# Patient Record
Sex: FEMALE | Race: WHITE | NOT HISPANIC OR LATINO
[De-identification: names, ages, dates, MRNs, and addresses within clinical notes are randomized per-mention and may not be internally consistent; named-entity substitution may affect disease eponyms.]

---

## 2017-09-21 ENCOUNTER — LAB SERVICES (OUTPATIENT)
Dept: OTHER | Age: 52
End: 2017-09-21

## 2017-09-21 ENCOUNTER — CHARTING TRANS (OUTPATIENT)
Dept: OTHER | Age: 52
End: 2017-09-21

## 2017-09-21 LAB — RAPID STREP GROUP A: NORMAL

## 2017-10-16 ENCOUNTER — HOSPITAL (OUTPATIENT)
Dept: OTHER | Age: 52
End: 2017-10-16

## 2017-10-16 ENCOUNTER — IMAGING SERVICES (OUTPATIENT)
Dept: OTHER | Age: 52
End: 2017-10-16

## 2017-10-28 ENCOUNTER — HOSPITAL (OUTPATIENT)
Dept: OTHER | Age: 52
End: 2017-10-28
Attending: INTERNAL MEDICINE

## 2018-03-06 ENCOUNTER — IMAGING SERVICES (OUTPATIENT)
Dept: OTHER | Age: 53
End: 2018-03-06

## 2018-03-06 ENCOUNTER — HOSPITAL (OUTPATIENT)
Dept: OTHER | Age: 53
End: 2018-03-06

## 2018-03-27 ENCOUNTER — HOSPITAL (OUTPATIENT)
Dept: OTHER | Age: 53
End: 2018-03-27

## 2018-03-27 ENCOUNTER — IMAGING SERVICES (OUTPATIENT)
Dept: OTHER | Age: 53
End: 2018-03-27

## 2018-04-16 PROBLEM — R91.8 ABNORMALITY OF LUNG ON CHEST X-RAY: Status: ACTIVE | Noted: 2018-04-16

## 2018-04-20 PROBLEM — J41.0 SIMPLE CHRONIC BRONCHITIS (HCC): Status: ACTIVE | Noted: 2018-04-20

## 2018-05-13 PROBLEM — Z87.891 FORMER SMOKER: Status: ACTIVE | Noted: 2018-05-13

## 2018-08-07 PROCEDURE — 82784 ASSAY IGA/IGD/IGG/IGM EACH: CPT | Performed by: INTERNAL MEDICINE

## 2018-08-07 PROCEDURE — 82785 ASSAY OF IGE: CPT | Performed by: INTERNAL MEDICINE

## 2018-10-15 PROCEDURE — 86225 DNA ANTIBODY NATIVE: CPT | Performed by: INTERNAL MEDICINE

## 2018-10-15 PROCEDURE — 86038 ANTINUCLEAR ANTIBODIES: CPT | Performed by: INTERNAL MEDICINE

## 2018-10-15 PROCEDURE — 86235 NUCLEAR ANTIGEN ANTIBODY: CPT | Performed by: INTERNAL MEDICINE

## 2018-10-15 PROCEDURE — 83516 IMMUNOASSAY NONANTIBODY: CPT | Performed by: INTERNAL MEDICINE

## 2018-10-15 PROCEDURE — 86255 FLUORESCENT ANTIBODY SCREEN: CPT | Performed by: INTERNAL MEDICINE

## 2018-10-15 PROCEDURE — 36415 COLL VENOUS BLD VENIPUNCTURE: CPT | Performed by: INTERNAL MEDICINE

## 2018-10-15 PROCEDURE — 82085 ASSAY OF ALDOLASE: CPT | Performed by: INTERNAL MEDICINE

## 2018-10-15 PROCEDURE — 82103 ALPHA-1-ANTITRYPSIN TOTAL: CPT | Performed by: INTERNAL MEDICINE

## 2018-10-15 PROCEDURE — 86200 CCP ANTIBODY: CPT | Performed by: INTERNAL MEDICINE

## 2018-11-02 VITALS
SYSTOLIC BLOOD PRESSURE: 152 MMHG | DIASTOLIC BLOOD PRESSURE: 92 MMHG | HEART RATE: 66 BPM | OXYGEN SATURATION: 98 % | TEMPERATURE: 98.5 F | RESPIRATION RATE: 16 BRPM

## 2018-12-26 PROCEDURE — 86606 ASPERGILLUS ANTIBODY: CPT | Performed by: INTERNAL MEDICINE

## 2018-12-26 PROCEDURE — 86331 IMMUNODIFFUSION OUCHTERLONY: CPT | Performed by: INTERNAL MEDICINE

## 2019-01-06 PROBLEM — A31.0 ATYPICAL MYCOBACTERIAL INFECTION OF LUNG (HCC): Status: ACTIVE | Noted: 2019-01-06

## 2019-02-04 PROBLEM — K21.00 GASTROESOPHAGEAL REFLUX DISEASE WITH ESOPHAGITIS: Status: ACTIVE | Noted: 2019-02-04

## 2019-04-15 PROBLEM — H81.13 BPV (BENIGN POSITIONAL VERTIGO), BILATERAL: Status: ACTIVE | Noted: 2019-04-15

## 2019-04-26 ENCOUNTER — HOSPITAL (OUTPATIENT)
Dept: OTHER | Age: 54
End: 2019-04-26

## 2019-04-26 ENCOUNTER — HOSPITAL (OUTPATIENT)
Dept: OTHER | Age: 54
End: 2019-04-26
Attending: INTERNAL MEDICINE

## 2019-04-29 LAB — PATHOLOGIST NAME: NORMAL

## 2019-07-17 PROBLEM — R73.9 HYPERGLYCEMIA: Status: ACTIVE | Noted: 2019-07-17

## 2019-10-25 PROBLEM — F41.9 MILD ANXIETY: Status: ACTIVE | Noted: 2019-10-25

## 2019-10-25 PROBLEM — I25.10 CORONARY ARTERIOSCLEROSIS IN NATIVE ARTERY: Status: ACTIVE | Noted: 2019-10-25

## 2020-01-07 PROBLEM — A31.8: Status: ACTIVE | Noted: 2020-01-07

## 2020-01-07 PROBLEM — H60.501 ACUTE OTITIS EXTERNA OF RIGHT EAR, UNSPECIFIED TYPE: Status: ACTIVE | Noted: 2020-01-07

## 2020-01-07 PROBLEM — R05.9 COUGH: Status: ACTIVE | Noted: 2020-01-07

## 2020-04-10 PROBLEM — J30.1 SEASONAL ALLERGIC RHINITIS DUE TO POLLEN: Status: ACTIVE | Noted: 2020-04-10

## 2020-04-10 PROBLEM — R55 VASOMOTOR INSTABILITY: Status: ACTIVE | Noted: 2020-04-10

## 2020-04-10 PROBLEM — H60.501 ACUTE OTITIS EXTERNA OF RIGHT EAR, UNSPECIFIED TYPE: Status: RESOLVED | Noted: 2020-01-07 | Resolved: 2020-04-10

## 2020-06-18 PROBLEM — R92.8 ABNORMALITY OF LEFT BREAST ON SCREENING MAMMOGRAM: Status: ACTIVE | Noted: 2020-06-18

## 2020-09-28 PROBLEM — G62.0 DRUG-INDUCED POLYNEUROPATHY (HCC): Status: ACTIVE | Noted: 2020-09-28

## 2021-10-20 PROBLEM — R05.9 COUGH: Status: RESOLVED | Noted: 2020-01-07 | Resolved: 2021-10-20

## 2021-10-20 PROBLEM — R92.8 ABNORMALITY OF LEFT BREAST ON SCREENING MAMMOGRAM: Status: RESOLVED | Noted: 2020-06-18 | Resolved: 2021-10-20

## 2021-10-20 PROBLEM — A31.8: Status: RESOLVED | Noted: 2020-01-07 | Resolved: 2021-10-20

## 2021-10-20 PROBLEM — R91.8 ABNORMALITY OF LUNG ON CHEST X-RAY: Status: RESOLVED | Noted: 2018-04-16 | Resolved: 2021-10-20

## 2022-01-21 PROBLEM — U07.1 COVID-19 VIRUS INFECTION: Status: ACTIVE | Noted: 2022-01-21

## 2024-11-08 ENCOUNTER — HOSPITAL ENCOUNTER (OUTPATIENT)
Facility: HOSPITAL | Age: 59
Setting detail: HOSPITAL OUTPATIENT SURGERY
Discharge: HOME | End: 2024-11-08
Attending: INTERNAL MEDICINE | Admitting: INTERNAL MEDICINE
Payer: COMMERCIAL

## 2024-11-08 ENCOUNTER — ANESTHESIA (OUTPATIENT)
Dept: ENDOSCOPY | Facility: HOSPITAL | Age: 59
End: 2024-11-08
Payer: COMMERCIAL

## 2024-11-08 ENCOUNTER — ANESTHESIA EVENT (OUTPATIENT)
Dept: ENDOSCOPY | Facility: HOSPITAL | Age: 59
End: 2024-11-08
Payer: COMMERCIAL

## 2024-11-08 VITALS
TEMPERATURE: 97 F | OXYGEN SATURATION: 100 % | SYSTOLIC BLOOD PRESSURE: 123 MMHG | RESPIRATION RATE: 16 BRPM | DIASTOLIC BLOOD PRESSURE: 66 MMHG | WEIGHT: 153 LBS | HEIGHT: 64 IN | HEART RATE: 55 BPM | BODY MASS INDEX: 26.12 KG/M2

## 2024-11-08 PROCEDURE — 0DB68ZX EXCISION OF STOMACH, VIA NATURAL OR ARTIFICIAL OPENING ENDOSCOPIC, DIAGNOSTIC: ICD-10-PCS | Performed by: INTERNAL MEDICINE

## 2024-11-08 PROCEDURE — 0DB38ZX EXCISION OF LOWER ESOPHAGUS, VIA NATURAL OR ARTIFICIAL OPENING ENDOSCOPIC, DIAGNOSTIC: ICD-10-PCS | Performed by: INTERNAL MEDICINE

## 2024-11-08 PROCEDURE — 0DB18ZX EXCISION OF UPPER ESOPHAGUS, VIA NATURAL OR ARTIFICIAL OPENING ENDOSCOPIC, DIAGNOSTIC: ICD-10-PCS | Performed by: INTERNAL MEDICINE

## 2024-11-08 PROCEDURE — 88305 TISSUE EXAM BY PATHOLOGIST: CPT | Performed by: INTERNAL MEDICINE

## 2024-11-08 RX ORDER — SODIUM CHLORIDE, SODIUM LACTATE, POTASSIUM CHLORIDE, CALCIUM CHLORIDE 600; 310; 30; 20 MG/100ML; MG/100ML; MG/100ML; MG/100ML
INJECTION, SOLUTION INTRAVENOUS CONTINUOUS
Status: DISCONTINUED | OUTPATIENT
Start: 2024-11-08 | End: 2024-11-08

## 2024-11-08 RX ORDER — NALOXONE HYDROCHLORIDE 0.4 MG/ML
0.08 INJECTION, SOLUTION INTRAMUSCULAR; INTRAVENOUS; SUBCUTANEOUS ONCE AS NEEDED
Status: DISCONTINUED | OUTPATIENT
Start: 2024-11-08 | End: 2024-11-08

## 2024-11-08 RX ORDER — SODIUM CHLORIDE, SODIUM LACTATE, POTASSIUM CHLORIDE, CALCIUM CHLORIDE 600; 310; 30; 20 MG/100ML; MG/100ML; MG/100ML; MG/100ML
INJECTION, SOLUTION INTRAVENOUS CONTINUOUS PRN
Status: DISCONTINUED | OUTPATIENT
Start: 2024-11-08 | End: 2024-11-08 | Stop reason: SURG

## 2024-11-08 RX ADMIN — SODIUM CHLORIDE, SODIUM LACTATE, POTASSIUM CHLORIDE, CALCIUM CHLORIDE: 600; 310; 30; 20 INJECTION, SOLUTION INTRAVENOUS at 07:45:00

## 2024-11-08 NOTE — DISCHARGE INSTRUCTIONS
ENDOSCOPY DISCHARGE INSTRUCTIONS    Procedure Performed:   Gastroscopy    Endoscopist: No name on file  FINDINGS:   Normal EGD    MEDICATIONS:  You may resume all other medications today    DIET:  Resume Normal Diet    BIOPSIES:  Biopsy results will be released to you as soon as they are available as is now the law. This will not allow your physician the opportunity to go over these before they are released to you. It is not necessary to contact the office for explanation of these results. Your physician will contact you within a few business days of their release to review the findings with you    X-RAYS/LABS:   No X-rays/Labs were ordered today    ADDITIONAL RECOMMENDATIONS:        Activity for remainder of today:    REST TODAY  DO NOT drive or operate heavy machinery  DO NOT drink any alcoholic beverages  DO NOT sign any legal documents or make any important decisions    After your procedure(s):  It is not unusual to feel bloated or gassy .  Passing gas and belching is encouraged. Lying on your left side with your knees flexed may relieve the discomfort. A hot pack to the abdomen may also help.    After your gastroscopy (upper endoscopy): You may experience a slight sore throat which will subside. Throat lozenges or salt water gargle can be used.    FOLLOW-UP:  Contact the office at [phone] for follow-up appointment is needed or if you develop any of the following:    Severe abdominal pain/discomfort     Excessive bleeding                     Black tarry stool    Difficulty breathing/swallowing      Persistent nausea/vomiting  Fever above 100 degrees or chills

## 2024-11-08 NOTE — H&P
[facility]   part of [facility]    History & Physical    Casi Jackson Patient Status:  Hospital Outpatient Surgery    1965 MRN [id number]   Location [facility] ENDOSCOPY PAIN CENTER Attending Doroteo Grant MD   Hosp Day # 0 PCP Calin Merchant DO     Date:  2024  Date of Admission:  2024    History provided by:patient  gerd      HPI:   Casi Jackson is a(n) 59 year old female. 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    Migraine without aura and without status migrainosus, not intractable    Migraines    Triggers; stress ,too many nuts and blueberries,change in temperature    Mild persistent asthma without complication (HCC)    Pneumonia due to organism    Recurrent cold sores    Visual impairment     Past Surgical History:   Procedure Laterality Date          x3    Colonoscopy      Leep      25 years ago    Other surgical history  2015    bronchoscopy     Family History   Problem Relation Age of Onset    Heart Disorder Father     Hypertension Mother     Cancer Mother 35        cervical    Asthma Son     Breast Cancer Maternal Grandmother 58        late 50's     Social History:  Social History     Socioeconomic History    Marital status:    Tobacco Use    Smoking status: Former     Current packs/day: 0.00     Average packs/day: 0.5 packs/day for 19.0 years (9.5 ttl pk-yrs)     Types: Cigarettes     Start date: 1984     Quit date: 3/7/2003     Years since quittin.6    Smokeless tobacco: Never    Tobacco comments:     quit 15 yrs ago. pt smoked about half a pack for about 18 yrs   Vaping Use    Vaping status: Never Used   Substance and Sexual Activity     Alcohol use: Not Currently     Comment: \"very seldom\"    Drug use: No    Sexual activity: Yes     Partners: Male     Allergies/Medications:   Allergies: Allergies[1]  Medications Prior to Admission   Medication Sig    Zolmitriptan 2.5 MG Oral Tab Take 1 tablet (2.5 mg total) by mouth as needed for Migraine.    AZITHROMYCIN 500 MG Oral Tab TAKE 1 TABLET BY MOUTH EVERY DAY    FLOVENT  MCG/ACT Inhalation Aerosol TAKE 1 PUFF BY MOUTH TWICE A DAY    PANTOPRAZOLE 40 MG Oral Tab EC TAKE 1 TABLET BY MOUTH TWICE A DAY    LEVALBUTEROL TARTRATE 45 MCG/ACT Inhalation Aerosol INHALE 1 TO 2 PUFFS BY MOUTH EVERY 6 HOURS AS NEEDED FOR WHEEZE    ROSUVASTATIN 20 MG Oral Tab TAKE 1 TABLET BY MOUTH EVERY DAY AT NIGHT (Patient taking differently: Take 0.5 tablets (10 mg total) by mouth nightly.)    LOSARTAN 50 MG Oral Tab TAKE 1 TABLET BY MOUTH EVERY DAY    methylPREDNISolone 4 MG Oral Tablet Therapy Pack Use as directed for 6 days    FLUTICASONE PROPIONATE 50 MCG/ACT Nasal Suspension SPRAY 2 SPRAYS INTO EACH NOSTRIL EVERY DAY (Patient taking differently: 1 spray by Nasal route as needed.)       Review of Systems:   Pertinent items are noted in HPI.  A comprehensive review of systems was negative.    Physical Exam:   Vital Signs:  Blood pressure 136/73, temperature 97.1 °F (36.2 °C), temperature source Temporal, resp. rate 16, height 5' 4\" (1.626 m), weight 153 lb (69.4 kg).     General appearance:  alert, appears stated age, and cooperative  Head: Normocephalic, without obvious abnormality, atraumatic  Pulmonary: clear to auscultation bilaterally  Cardiovascular: S1, S2 normal, no murmur, click, rub or gallop, regular rate and rhythm  Abdominal: soft, non-tender; bowel sounds normal; no masses,  no organomegaly  Extremities: extremities normal, atraumatic, no cyanosis or edema        Results:     Lab Results   Component Value Date    WBC 3.97 (L) 11/12/2021    HGB 14.6 11/12/2021    HCT 43.3 11/12/2021     11/12/2021     CREATSERUM 0.78 11/12/2021    BUN 11.0 11/12/2021     11/12/2021    K 4.49 11/12/2021     11/12/2021    CO2 29.9 (H) 11/12/2021     11/12/2021    CA 10.0 11/12/2021    ALB 4.8 11/12/2021    ALKPHO 78 11/12/2021    BILT 0.52 11/12/2021    TP 6.7 11/12/2021    AST 28 11/12/2021    ALT 18 11/12/2021    TSH 1.300 04/02/2021    ESRML 8 11/12/2021    CRP <0.30 11/12/2021    B12 417 07/08/2020       No results found.        Assessment/Plan:   egd      Doroteo Grant MD  11/8/2024         [1]   Allergies  Allergen Reactions    Chicken NAUSEA AND VOMITING    Codeine NAUSEA AND VOMITING    Topiramate CONFUSION    Other OTHER (SEE COMMENTS)     Antihistamines-jittery    Lactose OTHER (SEE COMMENTS)     GI UPSET    Latex RASH    Tessalon [Benzonatate] OTHER (SEE COMMENTS)     Burning in throat

## 2024-11-08 NOTE — ANESTHESIA PREPROCEDURE EVALUATION
PRE-OP EVALUATION    Patient Name: Casi Jackson    Admit Diagnosis: COLON CANCER SCREENING  GASTROESOPHAGEAL REFLUX DISEASE, UNSPECIFIED WHETHER  ESOPHAGITIS PRESENT    Pre-op Diagnosis: COLON CANCER SCREENING  GASTROESOPHAGEAL REFLUX DISEASE, UNSPECIFIED WHETHER  ESOPHAGITIS PRESENT    ESOPHAGOGASTRODUODENOSCOPY (EGD)    Anesthesia Procedure: ESOPHAGOGASTRODUODENOSCOPY (EGD)    Surgeons and Role:     * Doroteo Grant MD - Primary    Pre-op vitals reviewed.  Temp: 97.1 °F (36.2 °C)  Resp: 16  BP: 136/73     Body mass index is 26.26 kg/m².    Current medications reviewed.  Hospital Medications:  No current facility-administered medications on file as of 2024.       Outpatient Medications:   Prescriptions Prior to Admission[1]    Allergies: Chicken, Codeine, Topiramate, Other, Lactose, Latex, and Tessalon [benzonatate]      Anesthesia Evaluation    Patient summary reviewed.    Anesthetic Complications  (-) history of anesthetic complications         GI/Hepatic/Renal    Negative GI/hepatic/renal ROS.  (+) GERD                           Cardiovascular    Negative cardiovascular ROS.  ECG reviewed.  Exercise tolerance: good     MET: >4      (+) hypertension     (+) CAD                                Endo/Other    Negative endo/other ROS.                              Pulmonary    Negative pulmonary ROS.                       Neuro/Psych    Negative neuro/psych ROS.               (+) headaches                   Past Surgical History:   Procedure Laterality Date          x3    Colonoscopy      Leep      25 years ago    Other surgical history  2015    bronchoscopy     Social History     Socioeconomic History    Marital status:    Tobacco Use    Smoking status: Former     Current packs/day: 0.00     Average packs/day: 0.5 packs/day for 19.0 years (9.5 ttl pk-yrs)     Types: Cigarettes     Start date: 1984     Quit date: 3/7/2003     Years since quittin.6    Smokeless tobacco: Never     Tobacco comments:     quit 15 yrs ago. pt smoked about half a pack for about 18 yrs   Vaping Use    Vaping status: Never Used   Substance and Sexual Activity    Alcohol use: Not Currently     Comment: \"very seldom\"    Drug use: No    Sexual activity: Yes     Partners: Male     History   Drug Use No     Available pre-op labs reviewed.               Airway      Mallampati: I  Mouth opening: >3 FB  TM distance: 4 - 6 cm  Neck ROM: full Cardiovascular    Cardiovascular exam normal.  Rhythm: regular  Rate: normal  (-) murmur   Dental             Pulmonary    Pulmonary exam normal.  Breath sounds clear to auscultation bilaterally.               Other findings              ASA: 2   Plan: MAC  NPO status verified and patient meets guidelines.  Patient has not taken beta blockers in last 24 hours.  Post-procedure pain management plan discussed with surgeon and patient.      Plan/risks discussed with: patient                Present on Admission:  **None**             [1]   Medications Prior to Admission   Medication Sig Dispense Refill Last Dose/Taking    Zolmitriptan 2.5 MG Oral Tab Take 1 tablet (2.5 mg total) by mouth as needed for Migraine. 12 tablet 1 Past Month    AZITHROMYCIN 500 MG Oral Tab TAKE 1 TABLET BY MOUTH EVERY DAY 30 tablet 1 11/7/2024    FLOVENT  MCG/ACT Inhalation Aerosol TAKE 1 PUFF BY MOUTH TWICE A DAY 3 each 0 11/7/2024    PANTOPRAZOLE 40 MG Oral Tab EC TAKE 1 TABLET BY MOUTH TWICE A  tablet 0 11/7/2024    LEVALBUTEROL TARTRATE 45 MCG/ACT Inhalation Aerosol INHALE 1 TO 2 PUFFS BY MOUTH EVERY 6 HOURS AS NEEDED FOR WHEEZE 45 each 0 Past Month    ROSUVASTATIN 20 MG Oral Tab TAKE 1 TABLET BY MOUTH EVERY DAY AT NIGHT (Patient taking differently: Take 0.5 tablets (10 mg total) by mouth nightly.) 90 tablet 1 11/7/2024    LOSARTAN 50 MG Oral Tab TAKE 1 TABLET BY MOUTH EVERY DAY 90 tablet 1 11/8/2024 Morning    methylPREDNISolone 4 MG Oral Tablet Therapy Pack Use as directed for 6 days 21 tablet 0  Unknown    FLUTICASONE PROPIONATE 50 MCG/ACT Nasal Suspension SPRAY 2 SPRAYS INTO EACH NOSTRIL EVERY DAY (Patient taking differently: 1 spray by Nasal route as needed.) 48 mL 0 More than a month

## 2024-11-08 NOTE — ANESTHESIA POSTPROCEDURE EVALUATION
[facility]    Casi Jackson Patient Status:  Hospital Outpatient Surgery   Age/Gender 59 year old female MRN [id number]   Location [facility] ENDOSCOPY PAIN CENTER Attending Doroteo Grant MD   Hosp Day # 0 PCP Calin Merchant DO       Anesthesia Post-op Note    ESOPHAGOGASTRODUODENOSCOPY (EGD) with biopsies    Procedure Summary       Date: 11/08/24 Room / Location:  ENDOSCOPY 03 /  ENDOSCOPY    Anesthesia Start: 0744 Anesthesia Stop:     Procedure: ESOPHAGOGASTRODUODENOSCOPY (EGD) with biopsies Diagnosis: (normal)    Surgeons: Doroteo Grant MD Anesthesiologist: Cindy Davis DO    Anesthesia Type: MAC ASA Status: 2            Anesthesia Type: MAC    Vitals Value Taken Time   /60 11/08/24 0759   Temp  11/08/24 0802   Pulse 57 11/08/24 0802   Resp 16 11/08/24 0759   SpO2 100 % 11/08/24 0802   Vitals shown include unfiled device data.    Patient Location: Endoscopy    Anesthesia Type: MAC    Airway Patency: patent    Postop Pain Control: adequate    Mental Status: mildly sedated but able to meaningfully participate in the post-anesthesia evaluation    Nausea/Vomiting: none    Cardiopulmonary/Hydration status: stable euvolemic    Complications: no apparent anesthesia related complications    Postop vital signs: stable    Dental Exam: Unchanged from Preop    Patient to be discharged home when criteria met.

## 2024-11-08 NOTE — OPERATIVE REPORT
EGD Operative Report    Casi Jackson Patient Status:  Hospital Outpatient Surgery    1965 MRN [id number]   [facility] ENDOSCOPY PAIN CENTER Attending Doroteo Grant MD   Hosp Day #   0 PCP Calin Merchant DO       Pre-op Diagnosis:   GASTROESOPHAGEAL REFLUX DISEASE, UNSPECIFIED WHETHER  ESOPHAGITIS PRESENT     Post-Op Diagnosis:    ESOPHAGUS:  normal-proximal and distal esophagus biopsied   STOMACH:  normal-biopsied   DUODENUM:  normal    Procedure Performed: Procedure(s):  ESOPHAGOGASTRODUODENOSCOPY (EGD) with biopsies    Informed Consent: Informed consent for both the procedure and sedation were obtained from the patient. The potentially life-threatening complications of sedation, bleeding,  Perforation, transfusion or repeat endoscopy were reviewed along with the possible need for hospitalization, surgical management, transfusion or repeat endoscopy should one of these complications arise. The patient understands and is agreeable to proceed.  Sedation Type: MAC-Patient received sedation with monitored anesthesia provided by an anesthesiologist    Procedure Description: The patient was placed in the left lateral decubitus position.  A bite block was placed in the patient’s mouth.  The endoscope was inserted through the mouth and advanced under direct visualization to the descending duodenum and was then withdrawn to examine the duodenal bulb and gastric antrum.  The endoscope was then retroflexed to examine the angulus, GE junction, cardia, body and fundus and then withdrawn to examine the esophagus. The endoscope was then removed from the patient. The patient tolerated the procedure well with no immediate complications and was transferred to the recovery area in stable condition.  Findings:    ESOPHAGUS:  normal-proximal and distal  esophagus biopsied   STOMACH:  normal-biopsied   DUODENUM:  normal  Recommendations: await pathology  Discharge:  The patient was given an after visit summary detailing the procedure, findings, recommendations and follow up plans.  Doroteo Grant MD  11/8/2024  7:39 AM

## 2025-07-03 ENCOUNTER — APPOINTMENT (OUTPATIENT)
Dept: GENERAL RADIOLOGY | Facility: HOSPITAL | Age: 60
End: 2025-07-03
Attending: INTERNAL MEDICINE
Payer: COMMERCIAL

## 2025-07-03 ENCOUNTER — HOSPITAL ENCOUNTER (OUTPATIENT)
Facility: HOSPITAL | Age: 60
Setting detail: HOSPITAL OUTPATIENT SURGERY
Discharge: HOME | End: 2025-07-03
Attending: INTERNAL MEDICINE | Admitting: INTERNAL MEDICINE
Payer: COMMERCIAL

## 2025-07-03 ENCOUNTER — ANESTHESIA (OUTPATIENT)
Dept: ENDOSCOPY | Facility: HOSPITAL | Age: 60
End: 2025-07-03
Payer: COMMERCIAL

## 2025-07-03 ENCOUNTER — ANESTHESIA EVENT (OUTPATIENT)
Dept: ENDOSCOPY | Facility: HOSPITAL | Age: 60
End: 2025-07-03
Payer: COMMERCIAL

## 2025-07-03 VITALS
BODY MASS INDEX: 25.61 KG/M2 | DIASTOLIC BLOOD PRESSURE: 66 MMHG | OXYGEN SATURATION: 100 % | TEMPERATURE: 98 F | HEART RATE: 84 BPM | HEIGHT: 64 IN | SYSTOLIC BLOOD PRESSURE: 121 MMHG | WEIGHT: 150 LBS | RESPIRATION RATE: 16 BRPM

## 2025-07-03 LAB
BASOPHILS NFR BRONCH: 0 %
COLOR FLD: COLORLESS
EOSINOPHIL NFR BRONCH: 1 %
LYMPHOCYTES NFR BRONCH: 23 %
MONOS+MACROS NFR BRONCH: 70 %
NEUTROPHILS NFR BRONCH: 6 %
TOTAL CELLS COUNTED FLD: 100
TURBIDITY CSF QL: CLEAR

## 2025-07-03 PROCEDURE — 88305 TISSUE EXAM BY PATHOLOGIST: CPT | Performed by: INTERNAL MEDICINE

## 2025-07-03 PROCEDURE — 87071 CULTURE AEROBIC QUANT OTHER: CPT | Performed by: INTERNAL MEDICINE

## 2025-07-03 PROCEDURE — 88312 SPECIAL STAINS GROUP 1: CPT | Performed by: INTERNAL MEDICINE

## 2025-07-03 PROCEDURE — 87077 CULTURE AEROBIC IDENTIFY: CPT | Performed by: INTERNAL MEDICINE

## 2025-07-03 PROCEDURE — 87205 SMEAR GRAM STAIN: CPT | Performed by: INTERNAL MEDICINE

## 2025-07-03 PROCEDURE — 87206 SMEAR FLUORESCENT/ACID STAI: CPT | Performed by: INTERNAL MEDICINE

## 2025-07-03 PROCEDURE — 87449 NOS EACH ORGANISM AG IA: CPT | Performed by: INTERNAL MEDICINE

## 2025-07-03 PROCEDURE — 89051 BODY FLUID CELL COUNT: CPT | Performed by: INTERNAL MEDICINE

## 2025-07-03 PROCEDURE — 87798 DETECT AGENT NOS DNA AMP: CPT | Performed by: INTERNAL MEDICINE

## 2025-07-03 PROCEDURE — 87102 FUNGUS ISOLATION CULTURE: CPT | Performed by: INTERNAL MEDICINE

## 2025-07-03 PROCEDURE — 87116 MYCOBACTERIA CULTURE: CPT | Performed by: INTERNAL MEDICINE

## 2025-07-03 PROCEDURE — 88104 CYTOPATH FL NONGYN SMEARS: CPT | Performed by: INTERNAL MEDICINE

## 2025-07-03 PROCEDURE — 87305 ASPERGILLUS AG IA: CPT | Performed by: INTERNAL MEDICINE

## 2025-07-03 PROCEDURE — 87281 PNEUMOCYSTIS CARINII AG IF: CPT | Performed by: INTERNAL MEDICINE

## 2025-07-03 PROCEDURE — 89050 BODY FLUID CELL COUNT: CPT | Performed by: INTERNAL MEDICINE

## 2025-07-03 RX ORDER — SODIUM CHLORIDE, SODIUM LACTATE, POTASSIUM CHLORIDE, CALCIUM CHLORIDE 600; 310; 30; 20 MG/100ML; MG/100ML; MG/100ML; MG/100ML
INJECTION, SOLUTION INTRAVENOUS CONTINUOUS
Status: DISCONTINUED | OUTPATIENT
Start: 2025-07-03 | End: 2025-07-03

## 2025-07-03 RX ORDER — LIDOCAINE HYDROCHLORIDE 10 MG/ML
INJECTION, SOLUTION EPIDURAL; INFILTRATION; INTRACAUDAL; PERINEURAL AS NEEDED
Status: DISCONTINUED | OUTPATIENT
Start: 2025-07-03 | End: 2025-07-03 | Stop reason: SURG

## 2025-07-03 RX ORDER — LIDOCAINE HYDROCHLORIDE 20 MG/ML
INJECTION, SOLUTION INFILTRATION; PERINEURAL
Status: DISCONTINUED | OUTPATIENT
Start: 2025-07-03 | End: 2025-07-03

## 2025-07-03 RX ORDER — LIDOCAINE HYDROCHLORIDE 20 MG/ML
INJECTION, SOLUTION EPIDURAL; INFILTRATION; INTRACAUDAL; PERINEURAL
Status: DISCONTINUED | OUTPATIENT
Start: 2025-07-03 | End: 2025-07-03

## 2025-07-03 RX ADMIN — SODIUM CHLORIDE, SODIUM LACTATE, POTASSIUM CHLORIDE, CALCIUM CHLORIDE: 600; 310; 30; 20 INJECTION, SOLUTION INTRAVENOUS at 08:40:00

## 2025-07-03 RX ADMIN — LIDOCAINE HYDROCHLORIDE 50 MG: 10 INJECTION, SOLUTION EPIDURAL; INFILTRATION; INTRACAUDAL; PERINEURAL at 08:45:00

## 2025-07-03 NOTE — OPERATIVE REPORT
Bronchoscopy Procedure Report     Preop diagnosis:       abnormal CT  Postop diagnosis:      abnormal CT  Procedure performed: Bronchoscopy, Diagnostic  Bronchoalveolar lavage, BAL     Sedation used:       MAC, topical lidocaine     Description of procedure: Informed consent was obtained. Oxygen applied via facemask.  Bronchoscope was inserted via oral route.  Normal vocal cord movement was noted. Trachea appeared normal. Samaria appeared sharp.  Mucous membranes appeared normal. There were no significant secretions identified      Left  and right lung were inspected to the subsegmental level.  No lesions seen.     BAL of RUL completed.  100cc of sterile saline instilled, 30cc of cloudy fluid aspirated back     Samples obtained:                   BAL of RUL     Post procedure CXR necessary? no  Follow up:       cultures, cytology     Patient tolerated the procedure well and was transferred to the recovery area. EBL was 0.     K.Nancy ALARCON

## 2025-07-03 NOTE — ANESTHESIA POSTPROCEDURE EVALUATION
[facility]    Casi Jackson Patient Status:  Hospital Outpatient Surgery   Age/Gender 59 year old female MRN [id number]   Location [facility] ENDOSCOPY PAIN CENTER Attending Elsy Cruz MD   Hosp Day # 0 PCP Calin Merchant DO       Anesthesia Post-op Note    BRONCHOSCOPY WITH BRONCHIAL ALVEOLAR LAVAGE    Procedure Summary       Date: 07/03/25 Room / Location:  ENDOSCOPY 03 /  ENDOSCOPY    Anesthesia Start: 0840 Anesthesia Stop: 0911    Procedure: BRONCHOSCOPY WITH BRONCHIAL ALVEOLAR LAVAGE Diagnosis: (ABNORMAL CT CHEST)    Surgeons: Elsy Cruz MD Anesthesiologist: Damien Kwok MD    Anesthesia Type: MAC ASA Status: 2            Anesthesia Type: MAC    Vitals Value Taken Time   /66 07/03/25 09:12        Pulse 84 07/03/25 09:12   Resp 16 07/03/25 09:12   SpO2 100 % 07/03/25 09:12           Patient Location: Endoscopy    Anesthesia Type: MAC    Airway Patency: patent    Postop Pain Control: adequate    Mental Status: mildly sedated but able to meaningfully participate in the post-anesthesia evaluation    Nausea/Vomiting: none    Cardiopulmonary/Hydration status: stable euvolemic    Complications: no apparent anesthesia related complications    Postop vital signs: stable    Dental Exam: Unchanged from Preop    Patient to be discharged home when criteria met.

## 2025-07-03 NOTE — H&P
[facility]    Casi Jackson Patient Status:  Hospital Outpatient Surgery    1965 MRN [id number]   Location [facility] ENDOSCOPY PAIN CENTER Attending Elsy Cruz MD   Hosp Day # 0 PCP Calin Merchant DO     Date of Admission: 7/3/2025  6:47 AM  Admission Diagnosis: ABNORMAL CT CHEST  Reason for Consult: abnormal CT     History of Present Illness: 58 yo female with history of bronchiectasis, NTM  s/p prolonged course of azithro and rifampin through 2022, then subsequently on azithro 250mg 5x/week who recently had CT chest with new areas of groundglass nodules and tree in bud disease most notable on the right concerning for atypical mycobacterial infection.  Pt follows with ID and bronchoscopy was requested to evaluate for resistance.  Pt presents today for scheduled bronch with BAL.     Past Medical History[1]   Past Surgical History[2]   Allergies[3]     Social History:   reports that she quit smoking about 22 years ago. Her smoking use included cigarettes. She started smoking about 41 years ago. She has a 9.5 pack-year smoking history. She has never used smokeless tobacco. She reports that she does not currently use alcohol. She reports that she does not use drugs.      Family History:  Family History[4]      Home Medications:  Medications Taking[5]     Current Medications:  Current Hospital Medications[6]     Review of Systems:  Constitutional: denies weight loss, fevers, chills, night sweats, or fatigue  HEENT: denies vision or hearing changes, eye pain, tinnitus, hearing loss, sore throat, epistaxis, sinus congestion  Cardiovascular: denies chest pain, PND, palpitations, edema, orthopnea, or syncope  Respiratory: denies SOB, dyspnea on exertion, denies cough, hemoptysis, wheezing, pleurisy  GI: denies nausea, vomiting, diarrhea, constipation, melena, abdominal pain  : denies hematuria, dysuria, hesitancy, or incontinence  Musculoskeletal: denies arthralgias,  myalgias, muscle weakness, or joint swelling  Skin: denies rash or pruritis; no jaundice  Neurologic: denies numbness, weakness, ataxia, tremors, or vertigo  Psychiatric: denies insomnia, depression, anxiety, or drug abuse  Endocrine: denies polydypsia, polyuria, cold/heat intolerance  Hematologic/lymphatic: denies easy bruising, new blood clots, or lymphedema  Allergic/immunologic: denies hay fever, hives, or new allergies       OBJECTIVE:  Patient Vitals for the past 24 hrs:   BP Temp Temp src Pulse Resp SpO2 Height Weight   07/03/25 0711 130/65 97.7 °F (36.5 °C) Temporal 60 16 100 % 5' 4\" (1.626 m) 150 lb (68 kg)     O2 requirement: RA  Wt Readings from Last 3 Encounters:   07/03/25 150 lb (68 kg)   11/08/24 153 lb (69.4 kg)   01/03/22 157 lb (71.2 kg)        No intake/output data recorded.  No intake/output data recorded.     Physical Exam:                          General: alert, cooperative, in NAD                          HEENT: oropharynx clear without erythema or exudates, moist mucous membranes                          Lungs: Clear to auscultation bilaterally, no wheezes or crackles                           Chest wall: No tenderness or deformity.                          Heart: Regular rate and rhythm, normal S1S2                          Abdomen: soft, non-tender, non-distended, positive BS.                          Extremity: No clubbing or cyanosis. no edema                          Skin: No rashes or lesions.             No results found for: \"PT\", \"INR\"       Imaging: I have independently visualized all relevant chest imaging in PACS.  I agree with the radiology interpretation except where noted.       ASSESSMENT/PLAN:  Abnormal CT  -pt with history of NTM, on azithro who now has had progressive changes on CT scan  -pt following with ID, plan for bronch with BAL  -will send for aerobic anaerobic cultures, fungal stains and cultures, AFB stains and cultures with sensitivities, Aspergillus  galactomannan, PJP by PCR and DFA, Blastomyces antigen   -The rationale for performing the bronchoscopy, including risks and benefits were explained to the patient and questions were answered.  The risks of drug reactions, bleeding, collapsed lung, fevers, and hoarseness were discussed, along with other less common complications including death.  The patients understands the procedure and agrees to proceed.      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    Migraine without aura and without status migrainosus, not intractable    Migraines    Triggers; stress ,too many nuts and blueberries,change in temperature    Mild persistent asthma without complication (HCC)    Pneumonia due to organism    Recurrent cold sores    Visual impairment   [2]   Past Surgical History:  Procedure Laterality Date          x3    Colonoscopy      Leep      25 years ago    Other surgical history  2015    bronchoscopy   [3]   Allergies  Allergen Reactions    Chicken NAUSEA AND VOMITING    Codeine NAUSEA AND VOMITING    Topiramate CONFUSION    Other OTHER (SEE COMMENTS)     Antihistamines-jittery    Lactose OTHER (SEE COMMENTS)     GI UPSET    Latex RASH    Tessalon [Benzonatate] OTHER (SEE COMMENTS)     Burning in throat   [4]   Family History  Problem Relation Age of Onset    Heart Disorder Father     Hypertension Mother     Cancer Mother 35        cervical    Asthma Son     Breast Cancer Maternal Grandmother 58        late 50's   [5]   Outpatient Medications Marked as Taking for the 7/3/25 encounter (Hospital Encounter)   Medication Sig Dispense Refill     Zolmitriptan 2.5 MG Oral Tab Take 1 tablet (2.5 mg total) by mouth as needed for Migraine. 12 tablet 1    FLOVENT  MCG/ACT Inhalation Aerosol TAKE 1 PUFF BY MOUTH TWICE A DAY 3 each 0    PANTOPRAZOLE 40 MG Oral Tab EC TAKE 1 TABLET BY MOUTH TWICE A  tablet 0    LEVALBUTEROL TARTRATE 45 MCG/ACT Inhalation Aerosol INHALE 1 TO 2 PUFFS BY MOUTH EVERY 6 HOURS AS NEEDED FOR WHEEZE 45 each 0    ROSUVASTATIN 20 MG Oral Tab TAKE 1 TABLET BY MOUTH EVERY DAY AT NIGHT (Patient taking differently: Take 0.5 tablets (10 mg total) by mouth nightly.) 90 tablet 1    LOSARTAN 50 MG Oral Tab TAKE 1 TABLET BY MOUTH EVERY DAY 90 tablet 1    AZITHROMYCIN 500 MG Oral Tab TAKE 1 TABLET BY MOUTH EVERY DAY 30 tablet 1   [6]   Current Facility-Administered Medications:     lactated ringers infusion, , Intravenous, Continuous    lidocaine (Xylocaine) 2 % injection, , , PRN

## 2025-07-03 NOTE — ANESTHESIA PREPROCEDURE EVALUATION
PRE-OP EVALUATION    Patient Name: Casi Jackson    Admit Diagnosis: ABNORMAL CT CHEST    Pre-op Diagnosis: ABNORMAL CT CHEST    BRONCHOSCOPY WITH BRONCHIAL ALVEOLAR LAVAGE    Anesthesia Procedure: BRONCHOSCOPY WITH BRONCHIAL ALVEOLAR LAVAGE    Surgeons and Role:     * Elsy Cruz MD - Primary    Pre-op vitals reviewed.  Temp: 97.7 °F (36.5 °C)  Pulse: 60  Resp: 16  BP: 130/65  SpO2: 100 %  Body mass index is 25.75 kg/m².    Current medications reviewed.  Hospital Medications:  Current Medications[1]    Outpatient Medications:   Prescriptions Prior to Admission[2]    Allergies: Chicken, Codeine, Topiramate, Other, Lactose, Latex, and Tessalon [benzonatate]      Anesthesia Evaluation    Patient summary reviewed.    Anesthetic Complications  (-) history of anesthetic complications         GI/Hepatic/Renal      (+) GERD                           Cardiovascular        Exercise tolerance: good     MET: >4      (+) hypertension   (+) hyperlipidemia  (+) CAD                                Endo/Other    Negative endo/other ROS.                              Pulmonary      (+) asthma                     Neuro/Psych    Negative neuro/psych ROS.                                  Past Surgical History[3]  Social Hx on file[4]  History   Drug Use No     Available pre-op labs reviewed.               Airway      Mallampati: II  Mouth opening: >3 FB  TM distance: > 6 cm  Neck ROM: full Cardiovascular    Cardiovascular exam normal.  Rhythm: regular  Rate: normal     Dental    Dentition appears grossly intact         Pulmonary    Pulmonary exam normal.  Breath sounds clear to auscultation bilaterally.               Other findings              ASA: 2   Plan: MAC  NPO status verified and patient meets guidelines.  Patient has not taken beta blockers in last 24 hours.  Post-procedure pain management plan discussed with surgeon and patient.      Plan/risks discussed with: patient and spouse                Present on  Admission:  **None**             [1]    lactated ringers infusion   Intravenous Continuous    lidocaine (Xylocaine) 2 % injection    PRN   [2]   Medications Prior to Admission   Medication Sig Dispense Refill Last Dose/Taking    Zolmitriptan 2.5 MG Oral Tab Take 1 tablet (2.5 mg total) by mouth as needed for Migraine. 12 tablet 1 2025    AZITHROMYCIN 500 MG Oral Tab TAKE 1 TABLET BY MOUTH EVERY DAY 30 tablet 1 2025    FLOVENT  MCG/ACT Inhalation Aerosol TAKE 1 PUFF BY MOUTH TWICE A DAY 3 each 0 2025    PANTOPRAZOLE 40 MG Oral Tab EC TAKE 1 TABLET BY MOUTH TWICE A  tablet 0 2025    LEVALBUTEROL TARTRATE 45 MCG/ACT Inhalation Aerosol INHALE 1 TO 2 PUFFS BY MOUTH EVERY 6 HOURS AS NEEDED FOR WHEEZE 45 each 0 Past Week    ROSUVASTATIN 20 MG Oral Tab TAKE 1 TABLET BY MOUTH EVERY DAY AT NIGHT (Patient taking differently: Take 0.5 tablets (10 mg total) by mouth nightly.) 90 tablet 1 2025    LOSARTAN 50 MG Oral Tab TAKE 1 TABLET BY MOUTH EVERY DAY 90 tablet 1 7/3/2025 Morning    methylPREDNISolone 4 MG Oral Tablet Therapy Pack Use as directed for 6 days (Patient not taking: Reported on 2025) 21 tablet 0 More than a month    FLUTICASONE PROPIONATE 50 MCG/ACT Nasal Suspension SPRAY 2 SPRAYS INTO EACH NOSTRIL EVERY DAY (Patient taking differently: 1 spray by Nasal route as needed.) 48 mL 0 More than a month   [3]   Past Surgical History:  Procedure Laterality Date          x3    Colonoscopy      Leep      25 years ago    Other surgical history  2015    bronchoscopy   [4]   Social History  Socioeconomic History    Marital status:    Tobacco Use    Smoking status: Former     Current packs/day: 0.00     Average packs/day: 0.5 packs/day for 19.0 years (9.5 ttl pk-yrs)     Types: Cigarettes     Start date: 1984     Quit date: 3/7/2003     Years since quittin.3    Smokeless tobacco: Never    Tobacco comments:     quit 15 yrs ago. pt smoked about half a pack for about  18 yrs   Vaping Use    Vaping status: Never Used   Substance and Sexual Activity    Alcohol use: Not Currently     Comment: \"very seldom\"    Drug use: No    Sexual activity: Yes     Partners: Male

## 2025-07-03 NOTE — DISCHARGE INSTRUCTIONS
Home Care Instructions Following Bronchoscopy / Endobronchial Ultrasound with Sedation    Diet:  Prior to your examination, a local anesthetic was used to numb the back of your throat. Do not eat or drink for two hours,until 11am.  Sip fluids initially and advance to your regular diet as tolerated.  Do not drink alcohol today.    Medication:  If you have questions about resuming your normal medications, please contact your Primary Care Physician.    Activities:  Do not drive today.  Do not operate any machinery today (including kitchen equipment).    What to Expect:  A sore throat  A cough  Hoarseness  A small amount of blood in your sputum    Contact Your Doctor If:  You have difficulty breathing  You have chest pain  You have a fever greater than 102°F  You cough up more than a few tablespoons of blood in your sputum    **If unable to reach your doctor, please go to the [facility] Emergency Room**    - Your referring physician will receive a full report of your examination.  - Please contact your physician’s office within one week for results if appointment not scheduled.

## 2025-07-08 LAB
ASPERGILLUS AG BAL/SERUM: 0.07 INDEX
PNEUMOCYST SMR DFA: NEGATIVE

## 2025-07-10 LAB
BLASTOMYCES AG INTERP: NEGATIVE
BLASTOMYCES AG INTERP: NEGATIVE
NON GYNE INTERPRETATION: NEGATIVE

## (undated) DEVICE — MEDI-VAC NON-CONDUCTIVE SUCTION TUBING: Brand: CARDINAL HEALTH

## (undated) DEVICE — SINGLE USE BIOPSY VALVE MAJ-210: Brand: SINGLE USE BIOPSY VALVE (STERILE)

## (undated) DEVICE — BITEBLOCK ENDOSCP 60FR MAXI STRP

## (undated) DEVICE — TRAP,MUCUS SPECIMEN, 80CC: Brand: MEDLINE

## (undated) DEVICE — SYRINGE MED 10ML SLIP TIP CLR BRL TAPR PLUNG

## (undated) DEVICE — GLOVE SUR 6.5 SENSICARE PIP WHT PWD F

## (undated) DEVICE — 10FT COMBINED O2 DELIVERY/CO2 MONITORING. FILTER WITH MICROSTREAM TYPE LUER: Brand: DUAL ADULT NASAL CANNULA

## (undated) DEVICE — 3M™ RED DOT™ MONITORING ELECTRODE WITH FOAM TAPE AND STICKY GEL, 50/BAG, 20/CASE, 72/PLT 2570: Brand: RED DOT™

## (undated) DEVICE — SINGLE USE SUCTION VALVE MAJ-209: Brand: SINGLE USE SUCTION VALVE (STERILE)

## (undated) DEVICE — GIJAW SINGLE-USE BIOPSY FORCEPS WITH NEEDLE: Brand: GIJAW

## (undated) DEVICE — ENDOSCOPY PACK - LOWER: Brand: MEDLINE INDUSTRIES, INC.

## (undated) DEVICE — MASK,FACE,MAXFLUIDPROTECT,SHIELD/TIES: Brand: MEDLINE

## (undated) DEVICE — V2 SPECIMEN COLLECTION MANIFOLD KIT: Brand: NEPTUNE

## (undated) DEVICE — KIT VLV 5 PC AIR H2O SUCT BX ENDOGATOR CONN

## (undated) DEVICE — 1200CC GUARDIAN II: Brand: GUARDIAN

## (undated) DEVICE — 60 ML SYRINGE REGULAR TIP: Brand: MONOJECT

## (undated) DEVICE — KIT CUSTOM ENDOPROCEDURE STERIS

## (undated) NOTE — LETTER
[address]  [id number]  Authorization for Surgical Operation and Procedure     Date:___________                                                                                                         Time:__________  I hereby authorize Surgeon(s):  Doroteo Grant MD, my physician and his/her assistants (if applicable), which may include medical students, residents, and/or fellows, to perform the following surgical operation/ procedure and administer such anesthesia as may be determined necessary by my physician:  Operation/Procedure name (s) Procedure(s):  ESOPHAGOGASTRODUODENOSCOPY (EGD)  [facility]   2.   I recognize that during the surgical operation/procedure, unforeseen conditions may necessitate additional or different procedures than those listed above.  I, therefore, further authorize and request that the above-named surgeon, assistants, or designees perform such procedures as are, in their judgment, necessary and desirable.    3.   My surgeon/physician has discussed prior to my surgery the potential benefits, risks and side effects of this procedure; the likelihood of achieving goals; and potential problems that might occur during recuperation.  They also discussed reasonable alternatives to the procedure, including risks, benefits, and side effects related to the alternatives and risks related to not receiving this procedure.  I have had all my questions answered and I acknowledge that no guarantee has been made as to the result that may be obtained.    4.   Should the need arise during my operation/procedure, which includes change of level of care prior to discharge, I also consent to the administration of blood and/or blood products.  Further, I understand that despite careful testing and screening of blood or blood products by collecting agencies, I may still be subject to ill effects as a result of receiving a blood transfusion and/or blood  products.  The following are some, but not all, of the potential risks that can occur: fever and allergic reactions, hemolytic reactions, transmission of diseases such as Hepatitis, AIDS and Cytomegalovirus (CMV) and fluid overload.  In the event that I wish to have an autologous transfusion of my own blood, or a directed donor transfusion, I will discuss this with my physician.  Check only if Refusing Blood or Blood Products  I understand refusal of blood or blood products as deemed necessary by my physician may have serious consequences to my condition to include possible death. I hereby assume responsibility for my refusal and release the hospital, its personnel, and my physicians from any responsibility for the consequences of my refusal.          o  Refuse      5.   I authorize the use of any specimen, organs, tissues, body parts or foreign objects that may be removed from my body during the operation/procedure for diagnosis, research or teaching purposes and their subsequent disposal by hospital authorities.  I also authorize the release of specimen test results and/or written reports to my treating physician on the hospital medical staff or other referring or consulting physicians involved in my care, at the discretion of the Pathologist or my treating physician.    6.   I consent to the photographing or videotaping of the operations or procedures to be performed, including appropriate portions of my body for medical, scientific, or educational purposes, provided my identity is not revealed by the pictures or by descriptive texts accompanying them.  If the procedure has been photographed/videotaped, the surgeon will obtain the original picture, image, videotape or CD.  The hospital will not be responsible for storage, release or maintenance of the picture, image, tape or CD.    7.   I consent to the presence of a  or observers in the operating room as deemed necessary by my physician or  their designees.    8.   I recognize that in the event my procedure results in extended X-Ray/fluoroscopy time, I may develop a skin reaction.    9. If I have a Do Not Attempt Resuscitation (DNAR) order in place, that status will be suspended while in the operating room, procedural suite, and during the recovery period unless otherwise explicitly stated by me (or a person authorized to consent on my behalf). The surgeon or my attending physician will determine when the applicable recovery period ends for purposes of reinstating the DNAR order.  10. Patients having a sterilization procedure: I understand that if the procedure is successful the results will be permanent and it will therefore be impossible for me to inseminate, conceive, or bear children.  I also understand that the procedure is intended to result in sterility, although the result has not been guaranteed.   11. I acknowledge that my physician has explained sedation/analgesia administration to me including the risk and benefits I consent to the administration of sedation/analgesia as may be necessary or desirable in the judgment of my physician.    I CERTIFY THAT I HAVE READ AND FULLY UNDERSTAND THE ABOVE CONSENT TO OPERATION and/or OTHER PROCEDURE.    _________________________________________  __________________________________  Signature of Patient     Signature of Responsible Person         ___________________________________         Printed Name of Responsible Person           _________________________________                 Relationship to Patient  _________________________________________  ______________________________  Signature of Witness          Date  Time      Patient Name: Casi Jackson     : 1965                 Printed: 2024     Medical Record #: [id number]                     Page 1 of 2                                    [address]  [address]    Consent for  Anesthesia    I, Casi Jackson agree to be cared for by an anesthesiologist, who is specially trained to monitor me and give me medicine to put me to sleep or keep me comfortable during my procedure    I understand that my anesthesiologist is not an employee or agent of [facility] or [employer]. He or she works for [employer].    As the patient asking for anesthesia services, I agree to:  Allow the anesthesiologist (anesthesia doctor) to give me medicine and do additional procedures as necessary. Some examples are: Starting or using an “IV” to give me medicine, fluids or blood during my procedure, and having a breathing tube placed to help me breathe when I’m asleep (intubation). In the event that my heart stops working properly, I understand that my anesthesiologist will make every effort to sustain my life, unless otherwise directed by [facility] Do Not Resuscitate documents.  Tell my anesthesia doctor before my procedure:  If I am pregnant.  The last time that I ate or drank.  All of the medicines I take (including prescriptions, herbal supplements, and pills I can buy without a prescription (including street drugs/illegal medications). Failure to inform my anesthesiologist about these medicines may increase my risk of anesthetic complications.  If I am allergic to anything or have had a reaction to anesthesia before.  I understand how the anesthesia medicine will help me (benefits).  I understand that with any type of anesthesia medicine there are risks:  The most common risks are: nausea, vomiting, sore throat, muscle soreness, damage to my eyes, mouth, or teeth (from breathing tube placement).  Rare risks include: remembering what happened during my procedure, allergic reactions to medications, injury to my airway, heart, lungs, vision, nerves, or muscles and in extremely rare instances death.  My doctor has explained to me other choices available to me for my  care (alternatives).  Pregnant Patients (“epidural”):  I understand that the risks of having an epidural (medicine given into my back to help control pain during labor), include itching, low blood pressure, difficulty urinating, headache or slowing of the baby’s heart. Very rare risks include infection, bleeding, seizure, irregular heart rhythms and nerve injury.  Regional Anesthesia (“spinal”, “epidural”, & “nerve blocks”):  I understand that rare but potential complications include headache, bleeding, infection, seizure, irregular heart rhythms, and nerve injury.    I can change my mind about having anesthesia services at any time before I get the medicine.    _____________________________________________________________________________  Patient (or Representative) Signature/Relationship to Patient  Date   Time    _____________________________________________________________________________   Name (if used)    Language/Organization   Time    _____________________________________________________________________________  Anesthesiologist Signature     Date   Time  I have discussed the procedure and information above with the patient (or patient’s representative) and answered their questions. The patient or their representative has agreed to have anesthesia services.    _____________________________________________________________________________  Witness        Date   Time  I have verified that the signature is that of the patient or patient’s representative, and that it was signed before the procedure  Patient Name: Casi Jackson     : 1965                 Printed: 2024     Medical Record #: [id number]                     Page 2 of 2